# Patient Record
Sex: FEMALE | Race: OTHER | Employment: UNEMPLOYED | URBAN - METROPOLITAN AREA
[De-identification: names, ages, dates, MRNs, and addresses within clinical notes are randomized per-mention and may not be internally consistent; named-entity substitution may affect disease eponyms.]

---

## 2020-05-07 ENCOUNTER — HOSPITAL ENCOUNTER (EMERGENCY)
Age: 45
Discharge: HOME | End: 2020-05-07
Attending: EMERGENCY MEDICINE | Admitting: EMERGENCY MEDICINE
Payer: SELF-PAY

## 2020-05-07 ENCOUNTER — APPOINTMENT (OUTPATIENT)
Dept: CT IMAGING | Age: 45
End: 2020-05-07
Attending: PHYSICIAN ASSISTANT
Payer: SELF-PAY

## 2020-05-07 ENCOUNTER — APPOINTMENT (OUTPATIENT)
Dept: GENERAL RADIOLOGY | Age: 45
End: 2020-05-07
Attending: PHYSICIAN ASSISTANT
Payer: SELF-PAY

## 2020-05-07 VITALS
SYSTOLIC BLOOD PRESSURE: 123 MMHG | RESPIRATION RATE: 18 BRPM | WEIGHT: 160 LBS | OXYGEN SATURATION: 95 % | HEART RATE: 93 BPM | TEMPERATURE: 100.1 F | DIASTOLIC BLOOD PRESSURE: 80 MMHG

## 2020-05-07 DIAGNOSIS — N12 PYELONEPHRITIS: Primary | ICD-10-CM

## 2020-05-07 DIAGNOSIS — A41.9 SEPSIS WITHOUT ACUTE ORGAN DYSFUNCTION, DUE TO UNSPECIFIED ORGANISM (HCC): ICD-10-CM

## 2020-05-07 LAB
ALBUMIN SERPL-MCNC: 2.8 G/DL (ref 3.5–5)
ALBUMIN/GLOB SERPL: 0.5 {RATIO} (ref 1.1–2.2)
ALP SERPL-CCNC: 230 U/L (ref 45–117)
ALT SERPL-CCNC: 60 U/L (ref 12–78)
ANION GAP SERPL CALC-SCNC: 10 MMOL/L (ref 5–15)
APPEARANCE UR: ABNORMAL
AST SERPL-CCNC: 39 U/L (ref 15–37)
BACTERIA URNS QL MICRO: ABNORMAL /HPF
BASOPHILS # BLD: 0 K/UL (ref 0–0.1)
BASOPHILS NFR BLD: 0 % (ref 0–1)
BILIRUB SERPL-MCNC: 0.4 MG/DL (ref 0.2–1)
BILIRUB UR QL: NEGATIVE
BUN SERPL-MCNC: 10 MG/DL (ref 6–20)
BUN/CREAT SERPL: 13 (ref 12–20)
CALCIUM SERPL-MCNC: 8.7 MG/DL (ref 8.5–10.1)
CHLORIDE SERPL-SCNC: 103 MMOL/L (ref 97–108)
CO2 SERPL-SCNC: 24 MMOL/L (ref 21–32)
COLOR UR: ABNORMAL
CREAT SERPL-MCNC: 0.79 MG/DL (ref 0.55–1.02)
DIFFERENTIAL METHOD BLD: ABNORMAL
EOSINOPHIL # BLD: 0 K/UL (ref 0–0.4)
EOSINOPHIL NFR BLD: 0 % (ref 0–7)
EPITH CASTS URNS QL MICRO: ABNORMAL /LPF
ERYTHROCYTE [DISTWIDTH] IN BLOOD BY AUTOMATED COUNT: 13.3 % (ref 11.5–14.5)
GLOBULIN SER CALC-MCNC: 5.3 G/DL (ref 2–4)
GLUCOSE SERPL-MCNC: 115 MG/DL (ref 65–100)
GLUCOSE UR STRIP.AUTO-MCNC: NEGATIVE MG/DL
HCG UR QL: NEGATIVE
HCT VFR BLD AUTO: 34.7 % (ref 35–47)
HGB BLD-MCNC: 11.4 G/DL (ref 11.5–16)
HGB UR QL STRIP: ABNORMAL
IMM GRANULOCYTES # BLD AUTO: 0.1 K/UL (ref 0–0.04)
IMM GRANULOCYTES NFR BLD AUTO: 1 % (ref 0–0.5)
KETONES UR QL STRIP.AUTO: NEGATIVE MG/DL
LACTATE SERPL-SCNC: 0.8 MMOL/L (ref 0.4–2)
LEUKOCYTE ESTERASE UR QL STRIP.AUTO: ABNORMAL
LIPASE SERPL-CCNC: 108 U/L (ref 73–393)
LYMPHOCYTES # BLD: 2.1 K/UL (ref 0.8–3.5)
LYMPHOCYTES NFR BLD: 16 % (ref 12–49)
MCH RBC QN AUTO: 27.1 PG (ref 26–34)
MCHC RBC AUTO-ENTMCNC: 32.9 G/DL (ref 30–36.5)
MCV RBC AUTO: 82.4 FL (ref 80–99)
MONOCYTES # BLD: 1.4 K/UL (ref 0–1)
MONOCYTES NFR BLD: 11 % (ref 5–13)
NEUTS SEG # BLD: 9.3 K/UL (ref 1.8–8)
NEUTS SEG NFR BLD: 72 % (ref 32–75)
NITRITE UR QL STRIP.AUTO: POSITIVE
NRBC # BLD: 0 K/UL (ref 0–0.01)
NRBC BLD-RTO: 0 PER 100 WBC
PH UR STRIP: 5.5 [PH] (ref 5–8)
PLATELET # BLD AUTO: 329 K/UL (ref 150–400)
PMV BLD AUTO: 11.2 FL (ref 8.9–12.9)
POTASSIUM SERPL-SCNC: 4.6 MMOL/L (ref 3.5–5.1)
PROT SERPL-MCNC: 8.1 G/DL (ref 6.4–8.2)
PROT UR STRIP-MCNC: ABNORMAL MG/DL
RBC # BLD AUTO: 4.21 M/UL (ref 3.8–5.2)
RBC #/AREA URNS HPF: ABNORMAL /HPF (ref 0–5)
SODIUM SERPL-SCNC: 137 MMOL/L (ref 136–145)
SP GR UR REFRACTOMETRY: 1.01 (ref 1–1.03)
UA: UC IF INDICATED,UAUC: ABNORMAL
UROBILINOGEN UR QL STRIP.AUTO: 0.2 EU/DL (ref 0.2–1)
WBC # BLD AUTO: 13 K/UL (ref 3.6–11)
WBC URNS QL MICRO: ABNORMAL /HPF (ref 0–4)

## 2020-05-07 PROCEDURE — 99284 EMERGENCY DEPT VISIT MOD MDM: CPT

## 2020-05-07 PROCEDURE — 96365 THER/PROPH/DIAG IV INF INIT: CPT

## 2020-05-07 PROCEDURE — 71045 X-RAY EXAM CHEST 1 VIEW: CPT

## 2020-05-07 PROCEDURE — 87086 URINE CULTURE/COLONY COUNT: CPT

## 2020-05-07 PROCEDURE — 87040 BLOOD CULTURE FOR BACTERIA: CPT

## 2020-05-07 PROCEDURE — 87186 SC STD MICRODIL/AGAR DIL: CPT

## 2020-05-07 PROCEDURE — 74011250636 HC RX REV CODE- 250/636: Performed by: PHYSICIAN ASSISTANT

## 2020-05-07 PROCEDURE — 96375 TX/PRO/DX INJ NEW DRUG ADDON: CPT

## 2020-05-07 PROCEDURE — 81025 URINE PREGNANCY TEST: CPT

## 2020-05-07 PROCEDURE — 83605 ASSAY OF LACTIC ACID: CPT

## 2020-05-07 PROCEDURE — 83690 ASSAY OF LIPASE: CPT

## 2020-05-07 PROCEDURE — 36415 COLL VENOUS BLD VENIPUNCTURE: CPT

## 2020-05-07 PROCEDURE — 74011636320 HC RX REV CODE- 636/320: Performed by: EMERGENCY MEDICINE

## 2020-05-07 PROCEDURE — 74177 CT ABD & PELVIS W/CONTRAST: CPT

## 2020-05-07 PROCEDURE — 85025 COMPLETE CBC W/AUTO DIFF WBC: CPT

## 2020-05-07 PROCEDURE — 74011000258 HC RX REV CODE- 258: Performed by: PHYSICIAN ASSISTANT

## 2020-05-07 PROCEDURE — 81001 URINALYSIS AUTO W/SCOPE: CPT

## 2020-05-07 PROCEDURE — 93005 ELECTROCARDIOGRAM TRACING: CPT

## 2020-05-07 PROCEDURE — 87077 CULTURE AEROBIC IDENTIFY: CPT

## 2020-05-07 PROCEDURE — 80053 COMPREHEN METABOLIC PANEL: CPT

## 2020-05-07 PROCEDURE — 74011250637 HC RX REV CODE- 250/637: Performed by: PHYSICIAN ASSISTANT

## 2020-05-07 RX ORDER — ONDANSETRON 4 MG/1
4 TABLET, ORALLY DISINTEGRATING ORAL
Qty: 20 TAB | Refills: 0 | Status: SHIPPED | OUTPATIENT
Start: 2020-05-07

## 2020-05-07 RX ORDER — KETOROLAC TROMETHAMINE 30 MG/ML
30 INJECTION, SOLUTION INTRAMUSCULAR; INTRAVENOUS
Status: COMPLETED | OUTPATIENT
Start: 2020-05-07 | End: 2020-05-07

## 2020-05-07 RX ORDER — ACETAMINOPHEN 500 MG
1000 TABLET ORAL
Status: COMPLETED | OUTPATIENT
Start: 2020-05-07 | End: 2020-05-07

## 2020-05-07 RX ORDER — SODIUM CHLORIDE 0.9 % (FLUSH) 0.9 %
5-10 SYRINGE (ML) INJECTION AS NEEDED
Status: DISCONTINUED | OUTPATIENT
Start: 2020-05-07 | End: 2020-05-08 | Stop reason: HOSPADM

## 2020-05-07 RX ORDER — CEPHALEXIN 500 MG/1
500 CAPSULE ORAL 4 TIMES DAILY
Qty: 40 CAP | Refills: 0 | Status: SHIPPED | OUTPATIENT
Start: 2020-05-07 | End: 2020-05-17

## 2020-05-07 RX ORDER — SODIUM CHLORIDE 0.9 % (FLUSH) 0.9 %
5-10 SYRINGE (ML) INJECTION
Status: DISCONTINUED | OUTPATIENT
Start: 2020-05-07 | End: 2020-05-08 | Stop reason: HOSPADM

## 2020-05-07 RX ADMIN — IOPAMIDOL 100 ML: 755 INJECTION, SOLUTION INTRAVENOUS at 21:06

## 2020-05-07 RX ADMIN — SODIUM CHLORIDE 1000 ML: 900 INJECTION, SOLUTION INTRAVENOUS at 19:45

## 2020-05-07 RX ADMIN — KETOROLAC TROMETHAMINE 30 MG: 30 INJECTION, SOLUTION INTRAMUSCULAR; INTRAVENOUS at 19:06

## 2020-05-07 RX ADMIN — ACETAMINOPHEN 1000 MG: 500 TABLET, FILM COATED ORAL at 18:57

## 2020-05-07 RX ADMIN — SODIUM CHLORIDE 1000 ML: 900 INJECTION, SOLUTION INTRAVENOUS at 18:54

## 2020-05-07 RX ADMIN — CEFTRIAXONE SODIUM 1 G: 1 INJECTION, POWDER, FOR SOLUTION INTRAMUSCULAR; INTRAVENOUS at 18:58

## 2020-05-07 NOTE — ED PROVIDER NOTES
EMERGENCY DEPARTMENT HISTORY AND PHYSICAL EXAM      Date: 5/7/2020  Patient Name: Orlin Mendoza    History of Presenting Illness     Chief Complaint   Patient presents with    Fever     History Provided By: Patient and Divehi Speaking  Eve Alpers, Radiology). HPI: Orlin Mendoza, 38 y.o. female with history significant for tubal ligation who presents via private vehicle to the ED with cc of acute moderate subjective fever, generalized abdominal pain, bilateral flank pain described as \"burning\", intermittent constipation, dysuria, frequency, urgency X 6 days. Patient endorses taking over-the-counter Tylenol without relief. Endorses taking a \"kidney medication\" (unknown name of medication) without any relief. States that she did take magnesium over-the-counter to help with her constipation just prior to fever onset. Endorses that she did have a bowel movement after taking this medication. Endorses that she did have mild amount of vaginal bleeding. States that she has not had a menstrual cycle in many years and she thought that this bleeding was due to the medication. Denies any vaginal bleeding or discharge at present. Patient versus she has not taken any medication since 10 AM today. Patient denies any known sick contacts, known exposure to COVID-19 patient, recent travel. Denies lightheadedness, dizziness, syncope, sore throat, cough, congestion, neck pain or stiffness, chest pain, shortness of breath, dyspnea, wheezing, diarrhea, melena, hematochezia, hematuria. Denies any history of other surgeries on her abdomen. PCP: None    There are no other complaints, changes, or physical findings at this time. No current facility-administered medications on file prior to encounter. No current outpatient medications on file prior to encounter. Past History     Past Medical History:  History reviewed. No pertinent past medical history.   Past Surgical History:  Past Surgical History: Procedure Laterality Date    HX GYN      tubal ligation     Family History:  History reviewed. No pertinent family history. Social History:  Social History     Tobacco Use    Smoking status: Never Smoker    Smokeless tobacco: Never Used   Substance Use Topics    Alcohol use: Never     Frequency: Never    Drug use: Never     Allergies:  No Known Allergies  Review of Systems   Review of Systems   Constitutional: Positive for fatigue and fever. Negative for activity change, appetite change, chills and unexpected weight change. HENT: Negative. Negative for congestion, postnasal drip, rhinorrhea, sore throat, trouble swallowing and voice change. Eyes: Negative for photophobia and pain. Respiratory: Negative for cough, chest tightness, shortness of breath and wheezing. Cardiovascular: Negative for chest pain and palpitations. Gastrointestinal: Positive for abdominal pain, constipation, nausea and vomiting. Negative for abdominal distention, blood in stool and diarrhea. Genitourinary: Positive for dysuria, flank pain, frequency and urgency. Negative for decreased urine volume, difficulty urinating, hematuria and pelvic pain. Musculoskeletal: Positive for back pain. Negative for arthralgias, myalgias, neck pain and neck stiffness. Skin: Negative. Negative for color change, pallor and rash. Neurological: Positive for headaches. Negative for dizziness, seizures, syncope, weakness, light-headedness and numbness. Psychiatric/Behavioral: Negative. Negative for confusion. The patient is not nervous/anxious. Physical Exam   Physical Exam  Vitals signs and nursing note reviewed. Constitutional:       General: She is not in acute distress. Appearance: Normal appearance. She is well-developed. She is obese. She is not ill-appearing, toxic-appearing or diaphoretic. Comments: Well-appearing Divehi-speaking female sitting upright in no apparent distress.   Taking in clear complete sentences. HENT:      Head: Normocephalic and atraumatic. Right Ear: Hearing and external ear normal.      Left Ear: Hearing and external ear normal.      Nose: Nose normal. No congestion or rhinorrhea. Mouth/Throat:      Mouth: Mucous membranes are moist.   Eyes:      Conjunctiva/sclera: Conjunctivae normal.      Pupils: Pupils are equal, round, and reactive to light. Neck:      Musculoskeletal: Normal range of motion. Cardiovascular:      Rate and Rhythm: Regular rhythm. Tachycardia present. Pulses: Normal pulses. Heart sounds: Normal heart sounds. Pulmonary:      Effort: Pulmonary effort is normal. No respiratory distress. Breath sounds: Normal breath sounds. No stridor. No wheezing or rhonchi. Abdominal:      Palpations: Abdomen is soft. Tenderness: There is generalized abdominal tenderness. There is no right CVA tenderness, left CVA tenderness, guarding or rebound. Negative signs include Cullen's sign and McBurney's sign. Hernia: No hernia is present. Musculoskeletal: Normal range of motion. Skin:     General: Skin is warm and dry. Capillary Refill: Capillary refill takes less than 2 seconds. Neurological:      General: No focal deficit present. Mental Status: She is alert and oriented to person, place, and time. Psychiatric:         Mood and Affect: Mood normal.         Behavior: Behavior normal.         Thought Content: Thought content normal.         Judgment: Judgment normal.       Diagnostic Study Results   Labs -     No results found for this or any previous visit (from the past 12 hour(s)). Radiologic Studies -   CT ABD PELV W CONT   Final Result   IMPRESSION:      1. Several mildly enlarged lymph nodes in the right groin that are asymmetric to   the left side. These are nonspecific and could be reactive. Clinical correlation   is recommended. 2. No evidence of acute intra-abdominal or pelvic process.       XR CHEST PORT   Final Result IMPRESSION: No acute findings        No results found. Medical Decision Making   I am the first provider for this patient. I reviewed the vital signs, available nursing notes, past medical history, past surgical history, family history and social history. Vital Signs-Reviewed the patient's vital signs. No data found. Pulse Oximetry Analysis - 95% on RA and normal    Cardiac Monitor:   Rate: 93 bpm  Rhythm: Normal Sinus Rhythm      EKG interpretation: (Preliminary)  Rhythm: sinus tachycardia; and regular . Rate (approx.): 101; Axis: normal; SD interval: normal; QRS interval: normal ; ST/T wave: normal; Other findings: normal.    Records Reviewed: Nursing Notes and Old Medical Records    Provider Notes (Medical Decision Making):   Patient presents with fever, tachycardia and concerns for infection. Most likely intraabdominal origin  DDx: sepsis 2/2 UTI, PNA, intraabdominal infection (colitis, appendicitis, cholecystitis),  infectious diarrhea, meningitis, soft tissue infection, septic arthritis, flu/viral prodrome. Will follow sepsis protocol and order set by obtaining fluids, antibiotics, labs, lactate, EKG and frequently reassessing hemodynamic status on the patient. Will hold off on aggressive fluid hydration unless patient shows signs of severe sepsis or shock. ED Course:   Initial assessment performed. The patients presenting problems have been discussed, and they are in agreement with the care plan formulated and outlined with them. I have encouraged them to ask questions as they arise throughout their visit. ED Course as of May 14 1055   Thu May 07, 2020   1924 SIGN OUT:  7:22 PM  Discussed pt's hx, disposition, and available diagnostic and imaging results with attending, Dr. Priya Merritt. Reviewed care plans. Both providers and patient are in agreement with care plan. Sydell Bloch, PA, is transferring care of the pt to attending, Dr. Priya Merritt, at this time.      [SM]   9242 I received signout on this patient at 7 PM from Natalee Cardonaams. I just went to assess her with the nurse in the room and the language line. Told patient she has a UTI. Patient states she is feeling better. States she can swallow pills. Will DC if she tolerates p.o. challenge.    [SS]      ED Course User Index  [SM] Claribel Nogueira PA-C  [SS] Gely Uribe MD      SEPSIS ASSESSMENT NOTE:   6:29 PM  STEPHANIE Franklin, has seen and assessed the patient as follows:    Capillary Refill:normal/brisk  Cardiopulmonary Evaluation:   Lung Sounds: clear   Cardiac Sounds: regular  Peripheral Pulses: bounding  Skin Exam: skin unremarkable, warm, turgor good    Visit Vitals  /80 (BP 1 Location: Right arm, BP Patient Position: Sitting)   Pulse 93   Temp 100.1 °F (37.8 °C)   Resp 18   Wt 72.6 kg (160 lb)   SpO2 95%         Progress Note:   Updated pt on all returned results and findings. Discussed the importance of proper follow up as referred below along with return precautions. Pt in agreement with the care plan and expresses agreement with and understanding of all items discussed. Disposition:  Pt discharged by attending. PLAN:  1. Discharge Medication List as of 5/7/2020  9:43 PM      START taking these medications    Details   ondansetron (Zofran ODT) 4 mg disintegrating tablet Take 1 Tab by mouth every eight (8) hours as needed for Nausea. , Print, Disp-20 Tab, R-0      cephALEXin (Keflex) 500 mg capsule Take 1 Cap by mouth four (4) times daily for 10 days. , Print, Disp-40 Cap, R-0           2. Follow-up Information     Follow up With Specialties Details Why [address] [address]  [phone]    [facility] EMERGENCY DEPT Emergency Medicine  As needed, If symptoms worsen [address] [facility]  [phone]        Return to ED if worse     Diagnosis     Clinical Impression:   1. Pyelonephritis    2.  Sepsis without acute organ dysfunction, due to unspecified organism [facility])            Please note that this dictation was completed with Dragon, computer voice recognition software. Quite often unanticipated grammatical, syntax, homophones, and other interpretive errors are inadvertently transcribed by the computer software. Please disregard these errors. Additionally, please excuse any errors that have escaped final proofreading.

## 2020-05-07 NOTE — ED TRIAGE NOTES
Reports subjective fever for last 8 days and having some burning in back and stomach. Taking some unknown pills and felt better but not sure what they are. Peter Landau from radiology providing interpretive services in [address] [facility]

## 2020-05-08 ENCOUNTER — PATIENT OUTREACH (OUTPATIENT)
Dept: INTERNAL MEDICINE CLINIC | Age: 45
End: 2020-05-08

## 2020-05-08 LAB
ATRIAL RATE: 101 BPM
CALCULATED P AXIS, ECG09: 43 DEGREES
CALCULATED R AXIS, ECG10: 11 DEGREES
CALCULATED T AXIS, ECG11: 10 DEGREES
DIAGNOSIS, 93000: NORMAL
P-R INTERVAL, ECG05: 126 MS
Q-T INTERVAL, ECG07: 332 MS
QRS DURATION, ECG06: 82 MS
QTC CALCULATION (BEZET), ECG08: 430 MS
VENTRICULAR RATE, ECG03: 101 BPM

## 2020-05-08 NOTE — DISCHARGE INSTRUCTIONS
Patient [address] Departments     For adult and child immunizations, family planning, TB screening, STD testing and women's health services. [facility]: [place] [phone]      [facility] 25   [address]   [address]   170 [facility]: [place] [address] [facility] [place] [address] [phone]      [address]          [address] [place] [address]     For primary care services, woman and child wellness, and some clinics providing specialty care. [facility] -- [address] [facility] 25[address] [facility] [phone]   411 [facility] 200 [facility] 3614 [facility] [phone]   [address] [facility] [address] [place] [address] [phone] [address]  [email or web address] [address] [place] [address] [id number] [place] [address] [phone]   [address] [place] [address] [phone]   [facility] 81 [facility] [phone]   [facility] [address] [place] [address] [phone]   [facility]: [facility] [address], ext [address] [place] [address], [address] [phone]     [facility] [address] [facility] [address] [phone]   [facility] [address] [facility] [phone]   [employer]  [address], [address] [address] ([email or web address]) [phone]         Sexual Health/Woman Wellness Clinics    For STD/HIV testing and treatment, pregnancy testing and services, men's health, birth control services, LGBT services, and hepatitis/HPV vaccine services. [facility] for [place] [employer] [address] [facility] [address] [facility] [place] 1579 600 EDionicio Virgen [phone]   [facility] [address], 5th floor [phone]   Pregnancy [address] for Women 118 UZIEL Ybarra [facility] [phone] [address] [facility] [address] [phone]   [address] [facility]   [phone]   [place]   [phone]   Women, Infant and Children's Services: [address] [phone]       600 [facility]   [phone]   [address]   [address] [facility]     [phone]   [address] Crisis   1212 [facility] [phone]     Local Primary Care Physicians  [facility] [phone]  MD Adela Gaviria MD Starling Folk, MD [facility] [phone]  Richard Rizvi, P  MD Carmen Patel MD Wendolyn Ogle, MD [address] [facility] [phone]  MD Abhijit Salgado MD [address] [facility] [phone]  Gustavo Quinones, MD Rachel Cabrera MD Pixie Baas, MD   [facility] [phone]  [facility]I HONG MD  [facility], MD Frances Ariza, NP [address] [facility] [address] [phone]  Erica Loyd, MD Aldair Norwood MD Linna Sensor, MD Bethany Mullet, MD Orman Keener, MD Fernando Bae MD   33 [address] [facility]  Montse Galvez MD [address] [facility] [address] [phone]  MD Radha Polanco, NP  Jesica Barton, MD Gladys Caro, MD Nas Suazo, MD Tashi Cao MD   8595 [facility] [phone]  MD Kate Boudreaux, P  Suad Linares, KULDEEP Buckner, MD Noemi Buckner, MD [facility] [phone]  MD Raghavendra Srinivasan, MD  Micheal MD Rafael Mckeon MD Prudy Mackintosh, MD   [address] [phone]  MD Gregory Gomez MD [facility] [phone]  MD SOIL[facility] MD Grant Capps MD   1903 [facility] [phone]  MD Aman Harp MD Brendalyn Battiest, MD Cliffton Brooms, MD Tresia Gu[place], MD Ricarda Captain, NP  Berto Dumont MD 1619 [facility]   [phone]  MD Carlos Fay MD Dickey Sender, MD   2102 [facility] [phone]  MD Kirt Ramirez, [facility]  Jana Gipson, PA-C  Jana Gipson, [facility]  Julianna Godfrey, MD Osman Rivera, KULDEEP Andrew DO Miscellaneous:  Majo Michel [phone]         Infección renal: Instrucciones de cuidado  Kidney Infection: Care Instructions  Instrucciones de cuidado    Wilfredo infección renal (pielonefritis) es un tipo de infección urinaria (UTI, por wayne siglas en inglés). La mayoría de las UTI son infecciones de la vejiga. Las infecciones renales tienden a enfermar más a las personas que las infecciones de la vejiga. Satira Muss son más graves porque pueden causar daños duraderos si no se tratan con rapidez. La atención de seguimiento es wilfredo parte clave de goetz tratamiento y seguridad. Asegúrese de hacer y acudir a todas las citas, y llame a goetz médico si está teniendo problemas. También es wilfredo buena idea saber los resultados de wayne exámenes y mantener wilfredo lista de los medicamentos que nery. ¿Cómo puede cuidarse en el hogar? · [address]. No deje de tomarlos por el hecho de sentirse mejor. Debe trent todos los antibióticos hasta terminarlos. · Bettye abundante agua, lo suficiente para que goetz orina sea de color amarillo dannielle o transparente miguel el agua. [place] puede ayudar a eliminar las bacterias que causan la infección. Si tiene [facility], del corazón o del hígado y tiene que [place]'s líquidos, hable con goetz médico antes de aumentar goetz consumo. · Orine con frecuencia.  Trate de vaciar la vejiga cada vez que orine. · Para aliviar el dolor, tome wilfredo ducha caliente o colóquese wilfredo almohadilla térmica (a baja temperatura) sobre la parte baja del abdomen. Nunca se duerma mientras [place] wilfredo almohadilla térmica. Póngase un paño chamberlain entre la almohadilla térmica y la piel. Cómo ayudar a prevenir las infecciones renales  · Bettye abundante agua todos los GRASSE. Urie le ayuda a orinar con frecuencia, lo que elimina las bacterias del organismo. Si tiene [employer], del corazón o del hígado y tiene que [place]'s líquidos, hable con goetz médico antes de aumentar goetz consumo. · Orine en cuanto sienta la necesidad de hacerlo. No retenga la [employer]. Orine antes de irse a dormir. · Si usted presenta síntomas de infección en la vejiga, miguel ardor al orinar u orinar con [place]um Raider a goetz médico para que trate el problema antes de que empeore. Si no trata wilfredo infección de vejiga de inmediato, puede extenderse al riñón. · Los hombres deben mantener limpia la punta del pene. Si es uriel, tenga en cuenta estas ideas:  · Orine inmediatamente después de asia tenido [employer]. · Cámbiese las toallas sanitarias con frecuencia. Evite el uso de lavados vaginales, los aerosoles de higiene femenina y otros productos para la higiene femenina que contengan desodorantes. · Después de ir al baño, límpiese de adelante hacia atrás. ¿Cuándo debe pedir ayuda? Llame a goetz médico ahora mismo o busque atención médica inmediata si:    · Tiene dolor en aumento en la espalda marco a debajo de las [place]. Urie se llama dolor en el flanco.     · Tiene fiebre nueva o más sebastián y escalofríos.     · Tiene vómito o náuseas.    Preste especial atención a los cambios en goetz bessy y asegúrese de comunicarse con goetz médico si:    · Los síntomas, miguel el ardor al orinar, empeoran o mejoran Camacho Shook a aparecer.     · No está mejorando después de 2 días.    ¿Dónde puede encontrar más información en inglés? Nata Pacheco a [email or web address]-siddharth.info/  Rajwinder Abraham B319 en la búsqueda para aprender más acerca de \"Infección renal: Instrucciones de cuidado. \"  Revisado: 11 agosto, 2019Versión del contenido: 12.4  © 8836-7004 Healthwise, [employer]. Las instrucciones de cuidado fueron adaptadas bajo licencia por [employer] (which disclaims liability or warranty for this information). Si usted tiene [place] afección médica o sobre estas instrucciones, siempre pregunte a goetz profesional de bessy. [employer] niega toda garantía o responsabilidad por goetz uso de esta información.

## 2020-05-08 NOTE — ED NOTES
PO challenge: water. Will monitor  Provider at bedside explaining progression of care / [facility] phone .

## 2020-05-08 NOTE — ED NOTES
Patient (s)  given copy of dc instructions and 2 paper script(s) and 0 electronic scripts. Patient (s)  verbalized understanding of instructions and script (s). Patient given a current medication reconciliation form and verbalized understanding of their medications. Patient (s) verbalized understanding of the importance of discussing medications with  his or her physician or clinic they will be following up with. Patient alert and oriented and in no acute distress. Patient offered wheelchair from treatment area to hospital entrance, patient denies wheelchair. Discharge instruction given via bluephone. Pt reports her  is coming to pick her up.

## 2020-05-08 NOTE — PROGRESS NOTES
ACM attempted to follow up with patient after recent ED visit to provide education regarding COVID-19 symptoms and precautions. Attempts to reach patient were unsuccessful at all available numbers . The first phone call was done using [employer]   number [phone]. A message was left on patient's voice mail in [facility] [place] On final call an English VM was left for patient with the following information:  hotline number [phone], [facility] hotline number [phone], contact PCP for questions or concerns regarding COVID-19, use [employer] website for additional COVID-19 resources, CDC guidelines provided along with symptoms to watch out for and return to ED if having difficulty breathing. ACM supplied contact information. Episode of care will be resolved.    Donald Vasquez RN  Ambulatory Care Manager

## 2020-05-10 LAB
BACTERIA SPEC CULT: ABNORMAL
CC UR VC: ABNORMAL
SERVICE CMNT-IMP: ABNORMAL

## 2020-05-12 LAB
BACTERIA SPEC CULT: NORMAL
BACTERIA SPEC CULT: NORMAL
SERVICE CMNT-IMP: NORMAL
SERVICE CMNT-IMP: NORMAL